# Patient Record
Sex: FEMALE | Race: WHITE | NOT HISPANIC OR LATINO | Employment: FULL TIME | URBAN - METROPOLITAN AREA
[De-identification: names, ages, dates, MRNs, and addresses within clinical notes are randomized per-mention and may not be internally consistent; named-entity substitution may affect disease eponyms.]

---

## 2023-03-06 ENCOUNTER — TELEPHONE (OUTPATIENT)
Dept: PLASTIC SURGERY | Facility: CLINIC | Age: 48
End: 2023-03-06
Payer: MEDICAID

## 2023-03-06 NOTE — TELEPHONE ENCOUNTER
Pt called to see if a referral was needed for scar revision consult for  scar. Notified pt that a referral was not needed, and scheduled pt apt for  3/27 @ 3pm----- Message from Mary Owens sent at 3/6/2023  8:10 AM CST -----  Contact: self  Pt is calling to see if a referral  scar pls call [phone]

## 2023-09-07 ENCOUNTER — TELEPHONE (OUTPATIENT)
Dept: PLASTIC SURGERY | Facility: CLINIC | Age: 48
End: 2023-09-07
Payer: MEDICAID

## 2023-09-07 NOTE — TELEPHONE ENCOUNTER
Pt called wanting consult for scar revision. Stated to pt Dr. Ramos will no longer be with [facility] as of . Pt was notified that Dr. Ramos has her own private office in [place] if she'd like to proceed---cra----- Message from Shelby Elise sent at 2023 11:15 AM CDT -----  Contact: Pt  Pt is calling rg wanting to schedule with Dr Ramos rg having issues with an old  cut and can be reached at [phone]/ thanks/dbw